# Patient Record
Sex: MALE | Race: WHITE | NOT HISPANIC OR LATINO | Employment: OTHER | URBAN - NONMETROPOLITAN AREA
[De-identification: names, ages, dates, MRNs, and addresses within clinical notes are randomized per-mention and may not be internally consistent; named-entity substitution may affect disease eponyms.]

---

## 2021-11-18 ENCOUNTER — OFFICE VISIT (OUTPATIENT)
Dept: FAMILY MEDICINE | Facility: CLINIC | Age: 61
End: 2021-11-18
Payer: MEDICARE

## 2021-11-18 VITALS
HEIGHT: 73 IN | HEART RATE: 72 BPM | OXYGEN SATURATION: 96 % | DIASTOLIC BLOOD PRESSURE: 90 MMHG | BODY MASS INDEX: 30.22 KG/M2 | TEMPERATURE: 98 F | SYSTOLIC BLOOD PRESSURE: 134 MMHG | RESPIRATION RATE: 16 BRPM | WEIGHT: 228 LBS

## 2021-11-18 DIAGNOSIS — Z00.00 WELL ADULT EXAM: ICD-10-CM

## 2021-11-18 DIAGNOSIS — E66.8 OTHER OBESITY: ICD-10-CM

## 2021-11-18 DIAGNOSIS — Q61.3 POLYCYSTIC KIDNEY DISEASE: Primary | ICD-10-CM

## 2021-11-18 DIAGNOSIS — Z12.11 ENCOUNTER FOR SCREENING COLONOSCOPY: ICD-10-CM

## 2021-11-18 DIAGNOSIS — Z12.5 ENCOUNTER FOR SCREENING FOR MALIGNANT NEOPLASM OF PROSTATE: ICD-10-CM

## 2021-11-18 LAB
ALBUMIN SERPL BCP-MCNC: 3.5 G/DL (ref 3.5–5)
ALBUMIN/GLOB SERPL: 0.9 {RATIO}
ALP SERPL-CCNC: 71 U/L (ref 45–115)
ALT SERPL W P-5'-P-CCNC: 26 U/L (ref 16–61)
ANION GAP SERPL CALCULATED.3IONS-SCNC: 7 MMOL/L (ref 7–16)
AST SERPL W P-5'-P-CCNC: 19 U/L (ref 15–37)
BASOPHILS # BLD AUTO: 0.04 K/UL (ref 0–0.2)
BASOPHILS NFR BLD AUTO: 0.6 % (ref 0–1)
BILIRUB SERPL-MCNC: 0.4 MG/DL (ref 0–1.2)
BUN SERPL-MCNC: 17 MG/DL (ref 7–18)
BUN/CREAT SERPL: 12 (ref 6–20)
CALCIUM SERPL-MCNC: 9 MG/DL (ref 8.5–10.1)
CHLORIDE SERPL-SCNC: 107 MMOL/L (ref 98–107)
CHOLEST SERPL-MCNC: 225 MG/DL (ref 0–200)
CHOLEST/HDLC SERPL: 3 {RATIO}
CO2 SERPL-SCNC: 28 MMOL/L (ref 21–32)
CREAT SERPL-MCNC: 1.4 MG/DL (ref 0.7–1.3)
DIFFERENTIAL METHOD BLD: ABNORMAL
EOSINOPHIL # BLD AUTO: 0.19 K/UL (ref 0–0.5)
EOSINOPHIL NFR BLD AUTO: 2.7 % (ref 1–4)
ERYTHROCYTE [DISTWIDTH] IN BLOOD BY AUTOMATED COUNT: 13 % (ref 11.5–14.5)
GLOBULIN SER-MCNC: 3.8 G/DL (ref 2–4)
GLUCOSE SERPL-MCNC: 93 MG/DL (ref 74–106)
HCT VFR BLD AUTO: 46.1 % (ref 40–54)
HDLC SERPL-MCNC: 75 MG/DL (ref 40–60)
HGB BLD-MCNC: 15.2 G/DL (ref 13.5–18)
IMM GRANULOCYTES # BLD AUTO: 0.02 K/UL (ref 0–0.04)
IMM GRANULOCYTES NFR BLD: 0.3 % (ref 0–0.4)
LDLC SERPL CALC-MCNC: 116 MG/DL
LDLC/HDLC SERPL: 1.5 {RATIO}
LYMPHOCYTES # BLD AUTO: 2.39 K/UL (ref 1–4.8)
LYMPHOCYTES NFR BLD AUTO: 34.3 % (ref 27–41)
MCH RBC QN AUTO: 28.4 PG (ref 27–31)
MCHC RBC AUTO-ENTMCNC: 33 G/DL (ref 32–36)
MCV RBC AUTO: 86.2 FL (ref 80–96)
MONOCYTES # BLD AUTO: 0.65 K/UL (ref 0–0.8)
MONOCYTES NFR BLD AUTO: 9.3 % (ref 2–6)
MPC BLD CALC-MCNC: 11.1 FL (ref 9.4–12.4)
NEUTROPHILS # BLD AUTO: 3.68 K/UL (ref 1.8–7.7)
NEUTROPHILS NFR BLD AUTO: 52.8 % (ref 53–65)
NONHDLC SERPL-MCNC: 150 MG/DL
NRBC # BLD AUTO: 0 X10E3/UL
NRBC, AUTO (.00): 0 %
PLATELET # BLD AUTO: 248 K/UL (ref 150–400)
POTASSIUM SERPL-SCNC: 4.1 MMOL/L (ref 3.5–5.1)
PROT SERPL-MCNC: 7.3 G/DL (ref 6.4–8.2)
PSA SERPL-MCNC: 6.48 NG/ML (ref 0–4.1)
RBC # BLD AUTO: 5.35 M/UL (ref 4.6–6.2)
SODIUM SERPL-SCNC: 138 MMOL/L (ref 136–145)
TRIGL SERPL-MCNC: 169 MG/DL (ref 35–150)
VLDLC SERPL-MCNC: 34 MG/DL
WBC # BLD AUTO: 6.97 K/UL (ref 4.5–11)

## 2021-11-18 PROCEDURE — 3075F PR MOST RECENT SYSTOLIC BLOOD PRESS GE 130-139MM HG: ICD-10-PCS | Mod: ,,, | Performed by: FAMILY MEDICINE

## 2021-11-18 PROCEDURE — G0103 PSA SCREENING: HCPCS | Mod: ,,, | Performed by: CLINICAL MEDICAL LABORATORY

## 2021-11-18 PROCEDURE — 80053 COMPREHEN METABOLIC PANEL: CPT | Mod: ,,, | Performed by: CLINICAL MEDICAL LABORATORY

## 2021-11-18 PROCEDURE — 85025 COMPLETE CBC W/AUTO DIFF WBC: CPT | Mod: ,,, | Performed by: CLINICAL MEDICAL LABORATORY

## 2021-11-18 PROCEDURE — 99203 OFFICE O/P NEW LOW 30 MIN: CPT | Mod: ,,, | Performed by: FAMILY MEDICINE

## 2021-11-18 PROCEDURE — 80061 LIPID PANEL: ICD-10-PCS | Mod: ,,, | Performed by: CLINICAL MEDICAL LABORATORY

## 2021-11-18 PROCEDURE — 99203 PR OFFICE/OUTPT VISIT, NEW, LEVL III, 30-44 MIN: ICD-10-PCS | Mod: ,,, | Performed by: FAMILY MEDICINE

## 2021-11-18 PROCEDURE — 3008F BODY MASS INDEX DOCD: CPT | Mod: ,,, | Performed by: FAMILY MEDICINE

## 2021-11-18 PROCEDURE — G0103 PSA, SCREENING: ICD-10-PCS | Mod: ,,, | Performed by: CLINICAL MEDICAL LABORATORY

## 2021-11-18 PROCEDURE — 3075F SYST BP GE 130 - 139MM HG: CPT | Mod: ,,, | Performed by: FAMILY MEDICINE

## 2021-11-18 PROCEDURE — 1159F PR MEDICATION LIST DOCUMENTED IN MEDICAL RECORD: ICD-10-PCS | Mod: ,,, | Performed by: FAMILY MEDICINE

## 2021-11-18 PROCEDURE — 80053 COMPREHENSIVE METABOLIC PANEL: ICD-10-PCS | Mod: ,,, | Performed by: CLINICAL MEDICAL LABORATORY

## 2021-11-18 PROCEDURE — 85025 CBC WITH DIFFERENTIAL: ICD-10-PCS | Mod: ,,, | Performed by: CLINICAL MEDICAL LABORATORY

## 2021-11-18 PROCEDURE — 3008F PR BODY MASS INDEX (BMI) DOCUMENTED: ICD-10-PCS | Mod: ,,, | Performed by: FAMILY MEDICINE

## 2021-11-18 PROCEDURE — 1159F MED LIST DOCD IN RCRD: CPT | Mod: ,,, | Performed by: FAMILY MEDICINE

## 2021-11-18 PROCEDURE — 80061 LIPID PANEL: CPT | Mod: ,,, | Performed by: CLINICAL MEDICAL LABORATORY

## 2021-11-18 PROCEDURE — 3080F PR MOST RECENT DIASTOLIC BLOOD PRESSURE >= 90 MM HG: ICD-10-PCS | Mod: ,,, | Performed by: FAMILY MEDICINE

## 2021-11-18 PROCEDURE — 3080F DIAST BP >= 90 MM HG: CPT | Mod: ,,, | Performed by: FAMILY MEDICINE

## 2021-11-18 RX ORDER — ESOMEPRAZOLE MAGNESIUM 40 MG/1
40 CAPSULE, DELAYED RELEASE ORAL DAILY PRN
COMMUNITY

## 2021-11-19 DIAGNOSIS — R97.20 ELEVATED PSA: Primary | ICD-10-CM

## 2021-11-19 DIAGNOSIS — Z12.5 ENCOUNTER FOR SCREENING FOR MALIGNANT NEOPLASM OF PROSTATE: ICD-10-CM

## 2021-11-22 ENCOUNTER — HOSPITAL ENCOUNTER (OUTPATIENT)
Dept: RADIOLOGY | Facility: HOSPITAL | Age: 61
Discharge: HOME | End: 2021-11-22
Attending: FAMILY MEDICINE
Payer: MEDICARE

## 2021-11-22 DIAGNOSIS — Q61.3 POLYCYSTIC KIDNEY DISEASE: ICD-10-CM

## 2021-11-22 PROCEDURE — 76770 US EXAM ABDO BACK WALL COMP: CPT | Mod: TC

## 2021-11-23 ENCOUNTER — PATIENT MESSAGE (OUTPATIENT)
Dept: FAMILY MEDICINE | Facility: CLINIC | Age: 61
End: 2021-11-23
Payer: MEDICARE

## 2022-01-28 ENCOUNTER — OFFICE VISIT (OUTPATIENT)
Dept: FAMILY MEDICINE | Facility: CLINIC | Age: 62
End: 2022-01-28
Payer: MEDICARE

## 2022-01-28 VITALS
DIASTOLIC BLOOD PRESSURE: 78 MMHG | TEMPERATURE: 100 F | OXYGEN SATURATION: 95 % | RESPIRATION RATE: 18 BRPM | SYSTOLIC BLOOD PRESSURE: 138 MMHG | HEART RATE: 83 BPM

## 2022-01-28 DIAGNOSIS — J06.9 UPPER RESPIRATORY TRACT INFECTION, UNSPECIFIED TYPE: Primary | ICD-10-CM

## 2022-01-28 DIAGNOSIS — R05.8 COUGH WITH EXPOSURE TO COVID-19 VIRUS: ICD-10-CM

## 2022-01-28 DIAGNOSIS — Z20.822 COUGH WITH EXPOSURE TO COVID-19 VIRUS: ICD-10-CM

## 2022-01-28 DIAGNOSIS — R06.2 WHEEZES: ICD-10-CM

## 2022-01-28 LAB
CTP QC/QA: YES
SARS-COV-2 AG RESP QL IA.RAPID: NEGATIVE

## 2022-01-28 PROCEDURE — 1160F PR REVIEW ALL MEDS BY PRESCRIBER/CLIN PHARMACIST DOCUMENTED: ICD-10-PCS | Mod: ,,, | Performed by: NURSE PRACTITIONER

## 2022-01-28 PROCEDURE — 87426 SARSCOV CORONAVIRUS AG IA: CPT | Mod: QW,,, | Performed by: NURSE PRACTITIONER

## 2022-01-28 PROCEDURE — 3075F SYST BP GE 130 - 139MM HG: CPT | Mod: ,,, | Performed by: NURSE PRACTITIONER

## 2022-01-28 PROCEDURE — 1160F RVW MEDS BY RX/DR IN RCRD: CPT | Mod: ,,, | Performed by: NURSE PRACTITIONER

## 2022-01-28 PROCEDURE — 1159F MED LIST DOCD IN RCRD: CPT | Mod: ,,, | Performed by: NURSE PRACTITIONER

## 2022-01-28 PROCEDURE — 96372 PR INJECTION,THERAP/PROPH/DIAG2ST, IM OR SUBCUT: ICD-10-PCS | Mod: ,,, | Performed by: NURSE PRACTITIONER

## 2022-01-28 PROCEDURE — 3075F PR MOST RECENT SYSTOLIC BLOOD PRESS GE 130-139MM HG: ICD-10-PCS | Mod: ,,, | Performed by: NURSE PRACTITIONER

## 2022-01-28 PROCEDURE — 3078F DIAST BP <80 MM HG: CPT | Mod: ,,, | Performed by: NURSE PRACTITIONER

## 2022-01-28 PROCEDURE — 99213 OFFICE O/P EST LOW 20 MIN: CPT | Mod: 25,,, | Performed by: NURSE PRACTITIONER

## 2022-01-28 PROCEDURE — 99213 PR OFFICE/OUTPT VISIT, EST, LEVL III, 20-29 MIN: ICD-10-PCS | Mod: 25,,, | Performed by: NURSE PRACTITIONER

## 2022-01-28 PROCEDURE — 96372 THER/PROPH/DIAG INJ SC/IM: CPT | Mod: ,,, | Performed by: NURSE PRACTITIONER

## 2022-01-28 PROCEDURE — 1159F PR MEDICATION LIST DOCUMENTED IN MEDICAL RECORD: ICD-10-PCS | Mod: ,,, | Performed by: NURSE PRACTITIONER

## 2022-01-28 PROCEDURE — 3078F PR MOST RECENT DIASTOLIC BLOOD PRESSURE < 80 MM HG: ICD-10-PCS | Mod: ,,, | Performed by: NURSE PRACTITIONER

## 2022-01-28 PROCEDURE — 87426 SARS CORONAVIRUS 2 ANTIGEN POCT: ICD-10-PCS | Mod: QW,,, | Performed by: NURSE PRACTITIONER

## 2022-01-28 RX ORDER — AZITHROMYCIN 250 MG/1
TABLET, FILM COATED ORAL
Qty: 6 TABLET | Refills: 0 | Status: SHIPPED | OUTPATIENT
Start: 2022-01-28 | End: 2022-02-02

## 2022-01-28 RX ORDER — DEXAMETHASONE SODIUM PHOSPHATE 4 MG/ML
4 INJECTION, SOLUTION INTRA-ARTICULAR; INTRALESIONAL; INTRAMUSCULAR; INTRAVENOUS; SOFT TISSUE
Status: COMPLETED | OUTPATIENT
Start: 2022-01-28 | End: 2022-01-28

## 2022-01-28 RX ORDER — CEFTRIAXONE 1 G/1
1 INJECTION, POWDER, FOR SOLUTION INTRAMUSCULAR; INTRAVENOUS
Status: COMPLETED | OUTPATIENT
Start: 2022-01-28 | End: 2022-01-28

## 2022-01-28 RX ORDER — LEVALBUTEROL TARTRATE 45 UG/1
1-2 AEROSOL, METERED ORAL EVERY 4 HOURS PRN
Qty: 15 G | Refills: 0 | Status: SHIPPED | OUTPATIENT
Start: 2022-01-28 | End: 2023-09-26 | Stop reason: ALTCHOICE

## 2022-01-28 RX ADMIN — DEXAMETHASONE SODIUM PHOSPHATE 4 MG: 4 INJECTION, SOLUTION INTRA-ARTICULAR; INTRALESIONAL; INTRAMUSCULAR; INTRAVENOUS; SOFT TISSUE at 11:01

## 2022-01-28 RX ADMIN — CEFTRIAXONE 1 G: 1 INJECTION, POWDER, FOR SOLUTION INTRAMUSCULAR; INTRAVENOUS at 11:01

## 2022-01-28 NOTE — PROGRESS NOTES
2022     DAVIN Napier   [facility]  [address]  [place] [address]     PATIENT NAME: Romero Diaz  : 1960  DATE: 22  MRN: [id number]      Billing Provider: DAVIN Napier  Level of Service:   Patient PCP Information     Provider PCP Type    Chandler García DO General          Reason for Visit / Chief Complaint: COVID-19 Concerns (Positive home test), Nasal Congestion, Cough, Sore Throat, and Generalized Body Aches       Update PCP  Update Chief Complaint         History of Present Illness / Problem Focused Workflow     Romero Diaz presents to the clinic reports positive covid test at home and having cough wheezes nasal congestion sore throat body aches not vaccinated spouse in vehicle also unwell      Review of Systems     Review of Systems   Constitutional: Positive for chills and fatigue. Negative for activity change and appetite change.   HENT: Positive for nasal congestion and sore throat. Negative for ear pain, trouble swallowing and voice change.    Eyes: Negative for visual disturbance.   Respiratory: Positive for cough and wheezing. Negative for chest tightness and shortness of breath.    Cardiovascular: Negative for chest pain, palpitations, leg swelling and claudication.   Gastrointestinal: Negative for abdominal pain, change in bowel habit, nausea, vomiting and change in bowel habit.   Genitourinary: Negative for difficulty urinating.   Musculoskeletal: Positive for myalgias.   Integumentary:  Negative for pallor and rash.   Neurological: Positive for headaches.        Medical / Social / Family History     Past Medical History:   Diagnosis Date    Acquired polycystic kidney disease     Chronic sinusitis     Deviated nasal septum     SVT (supraventricular tachycardia)        Past Surgical History:   Procedure Laterality Date    CARDIAC ELECTROPHYSIOLOGY STUDY AND ABLATION       at [facility]    kidney removed Left     left  leg surgery         Social History    reports that he has never smoked. He has never used smokeless tobacco. He reports current alcohol use. He reports that he does not use drugs.    Family History  's family history includes Esophageal cancer in his father; Osteoporosis in his mother; Stomach cancer in his father.    Medications and Allergies     Medications  Outpatient Medications Marked as Taking for the 1/28/22 encounter (Office Visit) with DAVIN Napier   Medication Sig Dispense Refill    esomeprazole (NEXIUM) 40 MG capsule Take 40 mg by mouth daily as needed.       Current Facility-Administered Medications for the 1/28/22 encounter (Office Visit) with DAVIN Napier   Medication Dose Route Frequency Provider Last Rate Last Admin    [COMPLETED] cefTRIAXone injection 1 g  1 g Intramuscular 1 time in Clinic/HOD DAVIN Napier   1 g at 01/28/22 1103    [COMPLETED] dexamethasone injection 4 mg  4 mg Intramuscular 1 time in Clinic/HOD DAVIN Napier   4 mg at 01/28/22 1104       Allergies  Review of patient's allergies indicates:  No Known Allergies    Physical Examination     Vitals:    01/28/22 1024   BP: 138/78   BP Location: Left arm   Patient Position: Sitting   BP Method: Medium (Manual)   Pulse: 83   Resp: 18   Temp: 99.5 °F (37.5 °C)   TempSrc: Oral   SpO2: 95%      Physical Exam  Vitals and nursing note reviewed.   Constitutional:       General: He is not in acute distress.     Appearance: Normal appearance. He is not ill-appearing or toxic-appearing.   HENT:      Head: Normocephalic.      Nose: Rhinorrhea present.   Eyes:      Pupils: Pupils are equal, round, and reactive to light.      Comments: injected   Cardiovascular:      Rate and Rhythm: Normal rate and regular rhythm.   Pulmonary:      Effort: Pulmonary effort is normal.      Breath sounds: Wheezing present. No rhonchi or rales.   Musculoskeletal:      Cervical back: Normal range  of motion and neck supple. No rigidity or tenderness.   Lymphadenopathy:      Cervical: No cervical adenopathy.   Skin:     General: Skin is warm and dry.      Capillary Refill: Capillary refill takes less than 2 seconds.   Neurological:      Mental Status: He is alert and oriented to person, place, and time.   Psychiatric:         Behavior: Behavior normal.          Assessment and Plan (including Health Maintenance)      Problem List  Smart Sets  Document Outside HM   :    Plan:   rocephin decadron today will send azithromycin and xopenex inhaler, stay well hydrated        Health Maintenance Due   Topic Date Due    Hepatitis C Screening  Never done    TETANUS VACCINE  Never done    Colorectal Cancer Screening  Never done       Problem List Items Addressed This Visit    None     Visit Diagnoses     Upper respiratory tract infection, unspecified type    -  Primary    Relevant Medications    cefTRIAXone injection 1 g (Completed) (Start on 1/28/2022 11:15 AM)    dexamethasone injection 4 mg (Completed) (Start on 1/28/2022 11:15 AM)    azithromycin (Z-BLAYNE) 250 MG tablet    levalbuterol (XOPENEX HFA) 45 mcg/actuation inhaler    Cough with exposure to COVID-19 virus        Relevant Medications    dexamethasone injection 4 mg (Completed) (Start on 1/28/2022 11:15 AM)    levalbuterol (XOPENEX HFA) 45 mcg/actuation inhaler    Other Relevant Orders    SARS Coronavirus 2 Antigen, POCT (Completed)    Wheezes        Relevant Medications    cefTRIAXone injection 1 g (Completed) (Start on 1/28/2022 11:15 AM)    dexamethasone injection 4 mg (Completed) (Start on 1/28/2022 11:15 AM)    levalbuterol (XOPENEX HFA) 45 mcg/actuation inhaler        Upper respiratory tract infection, unspecified type  -     cefTRIAXone injection 1 g  -     dexamethasone injection 4 mg  -     azithromycin (Z-BLAYNE) 250 MG tablet; Take 2 tablets by mouth on day 1; Take 1 tablet by mouth on days 2-5  Dispense: 6 tablet; Refill: 0  -     levalbuterol  (XOPENEX HFA) 45 mcg/actuation inhaler; Inhale 1-2 puffs into the lungs every 4 (four) hours as needed for Wheezing. Rescue  Dispense: 15 g; Refill: 0    Cough with exposure to COVID-19 virus  -     SARS Coronavirus 2 Antigen, POCT  -     dexamethasone injection 4 mg  -     levalbuterol (XOPENEX HFA) 45 mcg/actuation inhaler; Inhale 1-2 puffs into the lungs every 4 (four) hours as needed for Wheezing. Rescue  Dispense: 15 g; Refill: 0    Wheezes  -     cefTRIAXone injection 1 g  -     dexamethasone injection 4 mg  -     levalbuterol (XOPENEX HFA) 45 mcg/actuation inhaler; Inhale 1-2 puffs into the lungs every 4 (four) hours as needed for Wheezing. Rescue  Dispense: 15 g; Refill: 0       Health Maintenance Topics with due status: Not Due       Topic Last Completion Date    Lipid Panel 11/18/2021       Procedures     Future Appointments   Date Time Provider Department Center   5/24/2022  1:15 PM [facility] [place]        No follow-ups on file.     Signature:  DAVIN Napier    Date of encounter: 1/28/22

## 2022-01-28 NOTE — PATIENT INSTRUCTIONS
rocephin decadron today will send azithromycin and xopenex inhaler, stay well hydrated    Covid Medication List          Vitamin D 5,000 units twice a day     Zinc 50 mg twice a day     Pepcid 20 mg once a day     Zyrtec 10 mg once a day     Aspirin 325 mg once a day     Vitamin C 1000 mg twice a day     Melatonin 3 mg at bedtime     Increase fluid intake and rest!      Stay home until:    At least 5 days have passed since your symptoms began (or since your positive test, if you have no symptoms),    AND you have no more symptoms, or your symptoms are improving and you have no fever and do not need fever-reducing medication such as Tylenol (acetaminophen) or Advil (ibuprofen).    Remain home after 5 days as long as you have a fever. Remaining at home is important to prevent transmitting infection to others.      The [place] Department of Health ([facility]) recommends the following after  exposure to a COVID-19 infected person:  Any exposed individual who develops symptoms should be tested and stay home.  If you have had a booster shot OR you have been fully vaccinated but are not yet eligible for  a booster because of timing*, you should:  ? Wear a mask around others for 10 days.  ? Test on day 5, if possible.  ? If you develop symptoms get a test and stay home.  *Applies to individuals who completed the primary series of Pfizer or Moderna vaccine within the last 6  months OR completed the primary series of J&J vaccine within the last 2 months  If you are unvaccinated OR are currently eligible for a booster dose but have not yet received  one* you should:  ? Stay home for 5 days. After that continue to wear a mask around others for 5 additional  days.  ? If you cant quarantine you must wear a mask for 10 days.  ? Test on day 5 if possible.  ? If you develop symptoms get a test and stay home  *Applies to individuals who completed the primary series of Pfizer or Moderna vaccine over 6 months  ago and are not  boosted OR completed the primary series of J&J over 2 months ago and are not  boosted  Additional considerations:  ? Please let your provider know you have been exposed if you do go in for testing.  ? If you live in a household with a person who is a confirmed case of COVID-19, your last  exposure is when is when you last had contact less than 6 feet for 15 minutes or more.  ? During the 5 day quarantine at home you may be allowed to continue to work if your employer  says you are essential, and you continue to have no symptoms, undergo symptom and  temperature monitoring by your facility and wear a mask while you are at work and around  others. Contact your employer for approval.  o If you do return to work, you should continue to self-quarantine at home at all other  times.  Official CDC Update on Isolation and Quarantine can be found at  https://www.cdc.gov/media/releases/2021/s1227-isolation-quarantine-guidance.html

## 2022-02-21 PROBLEM — Z00.00 WELL ADULT EXAM: Status: RESOLVED | Noted: 2021-11-18 | Resolved: 2022-02-21

## 2022-11-09 DIAGNOSIS — Z71.89 COMPLEX CARE COORDINATION: ICD-10-CM

## 2022-11-26 ENCOUNTER — OFFICE VISIT (OUTPATIENT)
Dept: FAMILY MEDICINE | Facility: CLINIC | Age: 62
End: 2022-11-26
Payer: MEDICARE

## 2022-11-26 VITALS
BODY MASS INDEX: 30.48 KG/M2 | TEMPERATURE: 100 F | WEIGHT: 230 LBS | DIASTOLIC BLOOD PRESSURE: 84 MMHG | OXYGEN SATURATION: 99 % | HEART RATE: 94 BPM | HEIGHT: 73 IN | SYSTOLIC BLOOD PRESSURE: 120 MMHG

## 2022-11-26 DIAGNOSIS — J02.9 SORE THROAT: ICD-10-CM

## 2022-11-26 DIAGNOSIS — Z20.822 CONTACT WITH AND (SUSPECTED) EXPOSURE TO COVID-19: ICD-10-CM

## 2022-11-26 DIAGNOSIS — U07.1 COVID-19: Primary | ICD-10-CM

## 2022-11-26 LAB
CTP QC/QA: YES
CTP QC/QA: YES
FLUAV AG NPH QL: NEGATIVE
FLUBV AG NPH QL: NEGATIVE
S PYO RRNA THROAT QL PROBE: NEGATIVE
SARS-COV-2 AG RESP QL IA.RAPID: POSITIVE

## 2022-11-26 PROCEDURE — 87880 STREP A ASSAY W/OPTIC: CPT | Mod: QW,,, | Performed by: NURSE PRACTITIONER

## 2022-11-26 PROCEDURE — 3079F DIAST BP 80-89 MM HG: CPT | Mod: ,,, | Performed by: NURSE PRACTITIONER

## 2022-11-26 PROCEDURE — 87880 POCT RAPID STREP A: ICD-10-PCS | Mod: QW,,, | Performed by: NURSE PRACTITIONER

## 2022-11-26 PROCEDURE — 1160F PR REVIEW ALL MEDS BY PRESCRIBER/CLIN PHARMACIST DOCUMENTED: ICD-10-PCS | Mod: ,,, | Performed by: NURSE PRACTITIONER

## 2022-11-26 PROCEDURE — 1159F PR MEDICATION LIST DOCUMENTED IN MEDICAL RECORD: ICD-10-PCS | Mod: ,,, | Performed by: NURSE PRACTITIONER

## 2022-11-26 PROCEDURE — 3008F PR BODY MASS INDEX (BMI) DOCUMENTED: ICD-10-PCS | Mod: ,,, | Performed by: NURSE PRACTITIONER

## 2022-11-26 PROCEDURE — 87428 POCT SARS-COV2 (COVID) WITH FLU ANTIGEN: ICD-10-PCS | Mod: QW,,, | Performed by: NURSE PRACTITIONER

## 2022-11-26 PROCEDURE — 3008F BODY MASS INDEX DOCD: CPT | Mod: ,,, | Performed by: NURSE PRACTITIONER

## 2022-11-26 PROCEDURE — 3074F SYST BP LT 130 MM HG: CPT | Mod: ,,, | Performed by: NURSE PRACTITIONER

## 2022-11-26 PROCEDURE — 1159F MED LIST DOCD IN RCRD: CPT | Mod: ,,, | Performed by: NURSE PRACTITIONER

## 2022-11-26 PROCEDURE — 3074F PR MOST RECENT SYSTOLIC BLOOD PRESSURE < 130 MM HG: ICD-10-PCS | Mod: ,,, | Performed by: NURSE PRACTITIONER

## 2022-11-26 PROCEDURE — 99213 PR OFFICE/OUTPT VISIT, EST, LEVL III, 20-29 MIN: ICD-10-PCS | Mod: ,,, | Performed by: NURSE PRACTITIONER

## 2022-11-26 PROCEDURE — 99213 OFFICE O/P EST LOW 20 MIN: CPT | Mod: ,,, | Performed by: NURSE PRACTITIONER

## 2022-11-26 PROCEDURE — 3079F PR MOST RECENT DIASTOLIC BLOOD PRESSURE 80-89 MM HG: ICD-10-PCS | Mod: ,,, | Performed by: NURSE PRACTITIONER

## 2022-11-26 PROCEDURE — 87428 SARSCOV & INF VIR A&B AG IA: CPT | Mod: QW,,, | Performed by: NURSE PRACTITIONER

## 2022-11-26 PROCEDURE — 1160F RVW MEDS BY RX/DR IN RCRD: CPT | Mod: ,,, | Performed by: NURSE PRACTITIONER

## 2022-11-26 NOTE — PROGRESS NOTES
[facility]    Chief Complaint      Chief Complaint   Patient presents with    Sore Throat    Cough    Nasal Congestion    Generalized Body Aches    Fatigue    Fever    Chills    Diarrhea    Documentation Only     Started wednesday       History of Present Illness      Romero Diaz is a 61 y.o. male. He  has a past medical history of Acquired polycystic kidney disease, Chronic sinusitis, Deviated nasal septum, and SVT (supraventricular tachycardia)., who presents today for URI type symptoms x4 days.    Past Medical History:  Past Medical History:   Diagnosis Date    Acquired polycystic kidney disease     Chronic sinusitis     Deviated nasal septum     SVT (supraventricular tachycardia)        Past Surgical History:   has a past surgical history that includes Cardiac electrophysiology study and ablation; left leg surgery; and kidney removed (Left, 2007).    Social History:  Social History     Tobacco Use    Smoking status: Never    Smokeless tobacco: Never   Substance Use Topics    Alcohol use: Yes     Comment: 3 beers daily     Drug use: Never       I personally reviewed all past medical, surgical, and social.     Review of Systems   Constitutional:  Positive for chills, fatigue and fever.   HENT:  Positive for congestion and sore throat. Negative for rhinorrhea and sneezing.    Respiratory:  Positive for cough.    Gastrointestinal:  Negative for diarrhea, nausea and vomiting.   Musculoskeletal:  Positive for myalgias.   Skin:  Negative for rash.   Neurological:  Negative for headaches.      Medications:  Outpatient Encounter Medications as of 11/26/2022   Medication Sig Dispense Refill    esomeprazole (NEXIUM) 40 MG capsule Take 40 mg by mouth daily as needed.      levalbuterol (XOPENEX HFA) 45 mcg/actuation inhaler Inhale 1-2 puffs into the lungs every 4 (four) hours as needed for Wheezing. Rescue 15 g 0     No facility-administered encounter medications on file as of 11/26/2022.       Allergies:  Review  "of patient's allergies indicates:  No Known Allergies    Health Maintenance:    There is no immunization history on file for this patient.   Health Maintenance   Topic Date Due    Hepatitis C Screening  Never done    TETANUS VACCINE  Never done    Lipid Panel  11/18/2026        Physical Exam      Vital Signs  Temp: 99.7 °F (37.6 °C)  Temp src: Oral  Pulse: 94  SpO2: 99 %  BP: 120/84  Height and Weight  Height: 6' 1" (185.4 cm)  Weight: 104.3 kg (230 lb)  BSA (Calculated - sq m): 2.32 sq meters  BMI (Calculated): 30.4  Weight in (lb) to have BMI = 25: 189.1]    Physical Exam  Vitals and nursing note reviewed.   Constitutional:       General: He is not in acute distress.     Appearance: He is ill-appearing.   HENT:      Head: Normocephalic.      Right Ear: Hearing normal.      Left Ear: Hearing normal.      Nose: Congestion present.   Eyes:      General: Lids are normal.      Conjunctiva/sclera: Conjunctivae normal.   Neck:      Thyroid: No thyromegaly.   Cardiovascular:      Rate and Rhythm: Normal rate.   Pulmonary:      Effort: Pulmonary effort is normal. No respiratory distress.   Musculoskeletal:         General: Normal range of motion.      Cervical back: Normal range of motion and neck supple.   Skin:     General: Skin is warm and dry.   Neurological:      General: No focal deficit present.      Mental Status: He is alert and oriented to person, place, and time.      Gait: Gait is intact.        Laboratory:  CBC:  Recent Labs   Lab 11/18/21  1538   WBC 6.97   RBC 5.35   Hemoglobin 15.2   Hematocrit 46.1   Platelet Count 248   MCV 86.2   MCH 28.4   MCHC 33.0     CMP:  Recent Labs   Lab 11/18/21  1538   Glucose 93   Calcium 9.0   Albumin 3.5   Total Protein 7.3   Sodium 138   Potassium 4.1   CO2 28   Chloride 107   BUN 17   Alk Phos 71   ALT 26   AST 19   Bilirubin, Total 0.4     LIPIDS:  Recent Labs   Lab 11/18/21  1538   HDL Cholesterol 75 H   Cholesterol 225 H   Triglycerides 169 H   LDL Calculated 116 "   Cholesterol/HDL Ratio (Risk Factor) 3.0   Non-     TSH:      A1C:        Assessment/Plan     Romero Diaz is a 61 y.o.male with:     1. Contact with and (suspected) exposure to covid-19  - POCT SARS-COV2 (COVID) with Flu Antigen    2. Sore throat  - POCT rapid strep A    3. COVID-19     Instructed to take Zyrtec, Pepcid, and Vit C OTC daily x 10 days  May return to work on 12/1/22 and then must wear a mask at all times anytime outside of the home until 12/5/22.      Total time spent face-to-face and non-face-to-face coordinating care for this encounter was: 20 minutes min    Chronic conditions status updated as per HPI.  Other than changes above, cont current medications and maintain follow up with specialists.  Return to clinic prn if symptoms worsen or fail to improve.    Elenita Dyer, FNP  [facility]

## 2022-12-06 ENCOUNTER — OFFICE VISIT (OUTPATIENT)
Dept: FAMILY MEDICINE | Facility: CLINIC | Age: 62
End: 2022-12-06
Payer: MEDICARE

## 2022-12-06 VITALS
DIASTOLIC BLOOD PRESSURE: 80 MMHG | BODY MASS INDEX: 29.69 KG/M2 | SYSTOLIC BLOOD PRESSURE: 140 MMHG | TEMPERATURE: 99 F | HEIGHT: 73 IN | WEIGHT: 224 LBS | OXYGEN SATURATION: 96 % | RESPIRATION RATE: 20 BRPM | HEART RATE: 94 BPM

## 2022-12-06 DIAGNOSIS — R05.9 COUGH, UNSPECIFIED TYPE: ICD-10-CM

## 2022-12-06 DIAGNOSIS — U07.1 COVID-19: Primary | ICD-10-CM

## 2022-12-06 PROCEDURE — 3008F BODY MASS INDEX DOCD: CPT | Mod: ,,, | Performed by: NURSE PRACTITIONER

## 2022-12-06 PROCEDURE — 1159F MED LIST DOCD IN RCRD: CPT | Mod: ,,, | Performed by: NURSE PRACTITIONER

## 2022-12-06 PROCEDURE — 3079F DIAST BP 80-89 MM HG: CPT | Mod: ,,, | Performed by: NURSE PRACTITIONER

## 2022-12-06 PROCEDURE — 3077F SYST BP >= 140 MM HG: CPT | Mod: ,,, | Performed by: NURSE PRACTITIONER

## 2022-12-06 PROCEDURE — 99213 PR OFFICE/OUTPT VISIT, EST, LEVL III, 20-29 MIN: ICD-10-PCS | Mod: ,,, | Performed by: NURSE PRACTITIONER

## 2022-12-06 PROCEDURE — 1160F PR REVIEW ALL MEDS BY PRESCRIBER/CLIN PHARMACIST DOCUMENTED: ICD-10-PCS | Mod: ,,, | Performed by: NURSE PRACTITIONER

## 2022-12-06 PROCEDURE — 3077F PR MOST RECENT SYSTOLIC BLOOD PRESSURE >= 140 MM HG: ICD-10-PCS | Mod: ,,, | Performed by: NURSE PRACTITIONER

## 2022-12-06 PROCEDURE — 3008F PR BODY MASS INDEX (BMI) DOCUMENTED: ICD-10-PCS | Mod: ,,, | Performed by: NURSE PRACTITIONER

## 2022-12-06 PROCEDURE — 3079F PR MOST RECENT DIASTOLIC BLOOD PRESSURE 80-89 MM HG: ICD-10-PCS | Mod: ,,, | Performed by: NURSE PRACTITIONER

## 2022-12-06 PROCEDURE — 1159F PR MEDICATION LIST DOCUMENTED IN MEDICAL RECORD: ICD-10-PCS | Mod: ,,, | Performed by: NURSE PRACTITIONER

## 2022-12-06 PROCEDURE — 1160F RVW MEDS BY RX/DR IN RCRD: CPT | Mod: ,,, | Performed by: NURSE PRACTITIONER

## 2022-12-06 PROCEDURE — 99213 OFFICE O/P EST LOW 20 MIN: CPT | Mod: ,,, | Performed by: NURSE PRACTITIONER

## 2022-12-06 RX ORDER — AZITHROMYCIN 250 MG/1
TABLET, FILM COATED ORAL
Qty: 6 TABLET | Refills: 0 | Status: SHIPPED | OUTPATIENT
Start: 2022-12-06 | End: 2022-12-11

## 2022-12-06 RX ORDER — ALBUTEROL SULFATE 90 UG/1
2 AEROSOL, METERED RESPIRATORY (INHALATION) EVERY 6 HOURS PRN
Qty: 18 G | Refills: 0 | Status: SHIPPED | OUTPATIENT
Start: 2022-12-06 | End: 2023-09-26 | Stop reason: ALTCHOICE

## 2022-12-06 NOTE — PROGRESS NOTES
DAVIN Toro   [place] [address]  [place]  [address]      PATIENT NAME: Romero Diaz  : 1960  DATE: 22  MRN: [id number]      Billing Provider: DAVIN Toro  Level of Service:   Patient PCP Information       Provider PCP Type    DAVIN Napier General            Reason for Visit / Chief Complaint: URI (Dx'd w/ COVID on . Persistent non-productive cough, chest discomfort), Fatigue, Diarrhea, and Sinus Problem (Pressure )       Update PCP  Update Chief Complaint         History of Present Illness / Problem Focused Workflow     Romero Diaz presents to the clinic with URI (Dx'd w/ COVID on . Persistent non-productive cough, chest discomfort), Fatigue, Diarrhea, and Sinus Problem (Pressure )     Patient presents to clinic with continued fatigue, congestion, cough x 2 weeks. Covid . Patient has taken prednisone with some improvement. Continued dry cough and severe fatigue.       Review of Systems     @Review of Systems   Constitutional:  Positive for fatigue. Negative for fever.   HENT:  Negative for nasal congestion, ear pain, postnasal drip, rhinorrhea and sinus pressure/congestion.    Eyes:  Negative for discharge and itching.   Respiratory:  Positive for cough. Negative for chest tightness, shortness of breath and wheezing.    Cardiovascular:  Negative for chest pain and palpitations.   Gastrointestinal:  Negative for abdominal pain, blood in stool, change in bowel habit and change in bowel habit.   Genitourinary:  Negative for dysuria.   Musculoskeletal:  Negative for joint swelling.   Neurological:  Negative for dizziness and headaches.     Medical / Social / Family History     Past Medical History:   Diagnosis Date    Acquired polycystic kidney disease     Chronic sinusitis     Deviated nasal septum     SVT (supraventricular tachycardia)        Past Surgical History:   Procedure Laterality Date    CARDIAC ELECTROPHYSIOLOGY STUDY AND  ABLATION       at [facility]    kidney removed Left 2007    left leg surgery         Social History    reports that he has never smoked. He has never used smokeless tobacco. He reports current alcohol use. He reports that he does not use drugs.    Family History  's family history includes Esophageal cancer in his father; Osteoporosis in his mother; Stomach cancer in his father.    Medications and Allergies     Medications  Outpatient Medications Marked as Taking for the 12/6/22 encounter (Office Visit) with DAVIN Toro   Medication Sig Dispense Refill    esomeprazole (NEXIUM) 40 MG capsule Take 40 mg by mouth daily as needed.         Allergies  Review of patient's allergies indicates:  No Known Allergies    Physical Examination     Vitals:    12/06/22 1349   BP: (!) 140/80   Pulse: 94   Resp: 20   Temp: 98.8 °F (37.1 °C)     Physical Exam  Constitutional:       General: He is not in acute distress.     Appearance: Normal appearance.   Cardiovascular:      Rate and Rhythm: Normal rate and regular rhythm.   Pulmonary:      Effort: Pulmonary effort is normal. No respiratory distress.      Breath sounds: Normal breath sounds. No wheezing, rhonchi or rales.   Skin:     General: Skin is warm and dry.   Neurological:      Mental Status: He is alert.   Psychiatric:         Mood and Affect: Mood normal.         Behavior: Behavior normal.             Lab Results   Component Value Date    WBC 6.97 11/18/2021    HGB 15.2 11/18/2021    HCT 46.1 11/18/2021    MCV 86.2 11/18/2021     11/18/2021          Sodium   Date Value Ref Range Status   11/18/2021 138 136 - 145 mmol/L Final     Potassium   Date Value Ref Range Status   11/18/2021 4.1 3.5 - 5.1 mmol/L Final     Chloride   Date Value Ref Range Status   11/18/2021 107 98 - 107 mmol/L Final     CO2   Date Value Ref Range Status   11/18/2021 28 21 - 32 mmol/L Final     Glucose   Date Value Ref Range Status   11/18/2021 93 74 - 106 mg/dL Final     BUN   Date  Value Ref Range Status   11/18/2021 17 7 - 18 mg/dL Final     Creatinine   Date Value Ref Range Status   11/18/2021 1.40 (H) 0.70 - 1.30 mg/dL Final     Calcium   Date Value Ref Range Status   11/18/2021 9.0 8.5 - 10.1 mg/dL Final     Total Protein   Date Value Ref Range Status   11/18/2021 7.3 6.4 - 8.2 g/dL Final     Albumin   Date Value Ref Range Status   11/18/2021 3.5 3.5 - 5.0 g/dL Final     Bilirubin, Total   Date Value Ref Range Status   11/18/2021 0.4 0.0 - 1.2 mg/dL Final     Alk Phos   Date Value Ref Range Status   11/18/2021 71 45 - 115 U/L Final     AST   Date Value Ref Range Status   11/18/2021 19 15 - 37 U/L Final     ALT   Date Value Ref Range Status   11/18/2021 26 16 - 61 U/L Final     Anion Gap   Date Value Ref Range Status   11/18/2021 7 7 - 16 mmol/L Final     eGFR   Date Value Ref Range Status   11/18/2021 55 (L) >=60 mL/min/1.73m² Final      US Kidney  Narrative: EXAMINATION:  US KIDNEY    CLINICAL HISTORY:  polycystic kidney disease;  Polycystic kidney, unspecified    TECHNIQUE:  Ultrasound of the kidneys was performed.    COMPARISON:  None.    FINDINGS:  The left kidney is surgically absent.    The right kidney measures 13.7 x 7.6 x 7.1 cm.  The cortical thickness is near normal.  The parenchymal echogenicity is normal.  Normal arterial flow is seen to the right kidney and there is no hydronephrosis.  At the midpole of the right kidney, there is a 1.2 x 0.5 x 1.1 cm hyperechoic focus consistent with a calculus.  At the midpole there is a small cyst measuring 1.6 x 1.8 x 1.5 cm and a smaller cyst measuring 0.8 x 0.8 x 1.0 cm.  No suspicious masses.  Impression: Two small cysts at the midpole of the right kidney.  Right renal calculus.  No hydronephrosis.    Electronically signed by: Lois Chapman  Date:    11/22/2021  Time:    13:32     Procedures   Assessment and Plan (including Health Maintenance)      Problem List  Smart Sets  Document Outside HM   :    Plan:           Problem List Items  Addressed This Visit          ID    COVID-19 - Primary    Current Assessment & Plan     Azithromycin - discussed use and side effects  Started patient on proair- discussed use and side effects, if causes tachycardia d/c medication immediately  Continue to push fluids, liquid IV   Add vitamin C and vitamin d  Discussed disease process, that it may take several months to recover from Covid  flonase / antihistamine for congestion          Relevant Medications    azithromycin (Z-BLAYNE) 250 MG tablet    albuterol (PROAIR HFA) 90 mcg/actuation inhaler     Other Visit Diagnoses       Cough, unspecified type        Relevant Orders    X-Ray Chest PA And Lateral            Health Maintenance Topics with due status: Not Due       Topic Last Completion Date    Lipid Panel 11/18/2021       No future appointments.     Health Maintenance Due   Topic Date Due    Hepatitis C Screening  Never done    COVID-19 Vaccine (1) Never done    TETANUS VACCINE  Never done    Colorectal Cancer Screening  Never done    Shingles Vaccine (1 of 2) Never done    Influenza Vaccine (1) Never done        Follow up if symptoms worsen or fail to improve.     Signature:  DAVIN Toro  [facility]  [address] [place] [address], [place]  [address]    Date of encounter: 12/6/22

## 2022-12-06 NOTE — ASSESSMENT & PLAN NOTE
Azithromycin - discussed use and side effects  Started patient on proair- discussed use and side effects, if causes tachycardia d/c medication immediately  Continue to push fluids, liquid IV   Add vitamin C and vitamin d  Discussed disease process, that it may take several months to recover from Covid  flonase / antihistamine for congestion

## 2023-06-09 DIAGNOSIS — Z71.89 COMPLEX CARE COORDINATION: ICD-10-CM

## 2023-09-26 ENCOUNTER — OFFICE VISIT (OUTPATIENT)
Dept: FAMILY MEDICINE | Facility: CLINIC | Age: 63
End: 2023-09-26
Payer: MEDICARE

## 2023-09-26 VITALS
HEART RATE: 71 BPM | HEIGHT: 73 IN | WEIGHT: 236 LBS | RESPIRATION RATE: 18 BRPM | BODY MASS INDEX: 31.28 KG/M2 | SYSTOLIC BLOOD PRESSURE: 148 MMHG | OXYGEN SATURATION: 99 % | TEMPERATURE: 99 F | DIASTOLIC BLOOD PRESSURE: 90 MMHG

## 2023-09-26 DIAGNOSIS — R03.0 ELEVATED BP WITHOUT DIAGNOSIS OF HYPERTENSION: ICD-10-CM

## 2023-09-26 DIAGNOSIS — K14.8 LESION OF TONGUE: Primary | ICD-10-CM

## 2023-09-26 PROCEDURE — 3008F BODY MASS INDEX DOCD: CPT | Mod: ,,, | Performed by: NURSE PRACTITIONER

## 2023-09-26 PROCEDURE — 3008F PR BODY MASS INDEX (BMI) DOCUMENTED: ICD-10-PCS | Mod: ,,, | Performed by: NURSE PRACTITIONER

## 2023-09-26 PROCEDURE — 3080F PR MOST RECENT DIASTOLIC BLOOD PRESSURE >= 90 MM HG: ICD-10-PCS | Mod: ,,, | Performed by: NURSE PRACTITIONER

## 2023-09-26 PROCEDURE — 3077F PR MOST RECENT SYSTOLIC BLOOD PRESSURE >= 140 MM HG: ICD-10-PCS | Mod: ,,, | Performed by: NURSE PRACTITIONER

## 2023-09-26 PROCEDURE — 1159F PR MEDICATION LIST DOCUMENTED IN MEDICAL RECORD: ICD-10-PCS | Mod: ,,, | Performed by: NURSE PRACTITIONER

## 2023-09-26 PROCEDURE — 3077F SYST BP >= 140 MM HG: CPT | Mod: ,,, | Performed by: NURSE PRACTITIONER

## 2023-09-26 PROCEDURE — 1159F MED LIST DOCD IN RCRD: CPT | Mod: ,,, | Performed by: NURSE PRACTITIONER

## 2023-09-26 PROCEDURE — 99213 PR OFFICE/OUTPT VISIT, EST, LEVL III, 20-29 MIN: ICD-10-PCS | Mod: ,,, | Performed by: NURSE PRACTITIONER

## 2023-09-26 PROCEDURE — 3080F DIAST BP >= 90 MM HG: CPT | Mod: ,,, | Performed by: NURSE PRACTITIONER

## 2023-09-26 PROCEDURE — 1160F RVW MEDS BY RX/DR IN RCRD: CPT | Mod: ,,, | Performed by: NURSE PRACTITIONER

## 2023-09-26 PROCEDURE — 1160F PR REVIEW ALL MEDS BY PRESCRIBER/CLIN PHARMACIST DOCUMENTED: ICD-10-PCS | Mod: ,,, | Performed by: NURSE PRACTITIONER

## 2023-09-26 PROCEDURE — 99213 OFFICE O/P EST LOW 20 MIN: CPT | Mod: ,,, | Performed by: NURSE PRACTITIONER

## 2023-09-26 NOTE — PROGRESS NOTES
"Clinic Note    Romero Diaz is a 62 y.o. male     Chief Complaint:   Chief Complaint   Patient presents with    Mouth Lesions     Spot on right side of tongue. Has been there about 1 month. Says that it affect eating and talking. Denies tobacco products.         Subjective:    Patient comes in today with spouse. Patient reports lesion to right side of tongue. States noticed area about 1-2 months ago. Denies pain but states uncomfortable. Denies bleeding. Denies drainage. Denies fever. Denies using tobacco products. States lesion is firm. Affects speech and eating.   Patient states he has one kidney and has polycystic kidney disease. Patient sees Dr. Beckman routinely every 4-6 months.     Mouth Lesions   Associated symptoms include mouth sores. Pertinent negatives include no fever, no headaches, no sore throat and no cough.        Allergies:   Review of patient's allergies indicates:  No Known Allergies     Past Medical History:  Past Medical History:   Diagnosis Date    Acquired polycystic kidney disease     Chronic sinusitis     Deviated nasal septum     SVT (supraventricular tachycardia)         Current Medications:    Current Outpatient Medications:     esomeprazole (NEXIUM) 40 MG capsule, Take 40 mg by mouth daily as needed., Disp: , Rfl:        Review of Systems   Constitutional:  Negative for fever.   HENT:  Positive for mouth sores. Negative for dental problem, drooling, sore throat, trouble swallowing and voice change.    Respiratory:  Negative for cough and shortness of breath.    Cardiovascular:  Negative for chest pain.   Neurological:  Negative for headaches.          Objective:    BP (!) 148/90 (BP Location: Right arm, Patient Position: Sitting, BP Method: Medium (Manual))   Pulse 71   Temp 98.7 °F (37.1 °C) (Oral)   Resp 18   Ht 6' 1" (1.854 m)   Wt 107 kg (236 lb)   SpO2 99%   BMI 31.14 kg/m²      Physical Exam  Constitutional:       Appearance: Normal appearance.   HENT:      Mouth/Throat:      " Mouth: Mucous membranes are moist.      Tongue: Lesions present.      Comments: Circular firm lesion to right side of tongue. Please see picture attached.  Eyes:      Extraocular Movements: Extraocular movements intact.   Cardiovascular:      Rate and Rhythm: Normal rate and regular rhythm.      Pulses: Normal pulses.      Heart sounds: Normal heart sounds.   Pulmonary:      Effort: Pulmonary effort is normal.      Breath sounds: Normal breath sounds.   Neurological:      Mental Status: He is alert and oriented to person, place, and time.            Assessment and Plan:    1. Lesion of tongue    2. Elevated BP without diagnosis of hypertension         Lesion of tongue  -refer to ent Dr. Pappas    Elevated BP without diagnosis of hypertension  -spouse to monitor bp at home. If persistently >140/90 spouse states will f/u with Dr. Beckman  -low salt diet and exercise        There are no Patient Instructions on file for this visit.   Follow up if symptoms worsen or fail to improve.

## 2023-09-27 ENCOUNTER — OFFICE VISIT (OUTPATIENT)
Dept: OTOLARYNGOLOGY | Facility: CLINIC | Age: 63
End: 2023-09-27
Payer: MEDICARE

## 2023-09-27 VITALS — WEIGHT: 236 LBS | BODY MASS INDEX: 30.29 KG/M2 | HEIGHT: 74 IN

## 2023-09-27 DIAGNOSIS — K14.8 TONGUE LESION: Primary | ICD-10-CM

## 2023-09-27 PROCEDURE — 99204 OFFICE O/P NEW MOD 45 MIN: CPT | Mod: S$PBB,,, | Performed by: OTOLARYNGOLOGY

## 2023-09-27 PROCEDURE — 1160F RVW MEDS BY RX/DR IN RCRD: CPT | Mod: CPTII,,, | Performed by: OTOLARYNGOLOGY

## 2023-09-27 PROCEDURE — 3008F BODY MASS INDEX DOCD: CPT | Mod: CPTII,,, | Performed by: OTOLARYNGOLOGY

## 2023-09-27 PROCEDURE — 99214 OFFICE O/P EST MOD 30 MIN: CPT | Mod: PBBFAC | Performed by: OTOLARYNGOLOGY

## 2023-09-27 PROCEDURE — 3008F PR BODY MASS INDEX (BMI) DOCUMENTED: ICD-10-PCS | Mod: CPTII,,, | Performed by: OTOLARYNGOLOGY

## 2023-09-27 PROCEDURE — 99204 PR OFFICE/OUTPT VISIT, NEW, LEVL IV, 45-59 MIN: ICD-10-PCS | Mod: S$PBB,,, | Performed by: OTOLARYNGOLOGY

## 2023-09-27 PROCEDURE — 1160F PR REVIEW ALL MEDS BY PRESCRIBER/CLIN PHARMACIST DOCUMENTED: ICD-10-PCS | Mod: CPTII,,, | Performed by: OTOLARYNGOLOGY

## 2023-09-27 PROCEDURE — 1159F PR MEDICATION LIST DOCUMENTED IN MEDICAL RECORD: ICD-10-PCS | Mod: CPTII,,, | Performed by: OTOLARYNGOLOGY

## 2023-09-27 PROCEDURE — 1159F MED LIST DOCD IN RCRD: CPT | Mod: CPTII,,, | Performed by: OTOLARYNGOLOGY

## 2023-09-27 NOTE — H&P (VIEW-ONLY)
"Subjective:       Patient ID: Romero Diaz is a 62 y.o. male.    Chief Complaint: tongue (Patient complaining of having a "lump" on his tongue. States in located on the far right side of his tongue by his wisdom teeth.)  + etoh   HPI  Review of Systems   HENT:  Positive for mouth sores.    All other systems reviewed and are negative.      Objective:      Physical Exam  General: NAD  Head: Normocephalic, atraumatic, no facial asymmetry/normal strength,  Ears: Both auricules normal in appearance, w/o deformities tympanic membranes normal external auditory canals normal  Nose: External nose w/o deformities normal turbinates no drainage or inflammation  Oral Cavity: Lips, gums, floor of mouth, tongue 1.5 cm lesion right lateral tongue hard palate, and buccal mucosa without mass/lesion  Oropharynx: Mucosa pink and moist, soft palate, posterior pharynx and oropharyngeal wall without mass/lesion  Neck: Supple, symmetric, trachea midline, no palpable mass/lesion, no palpable cervical lymphadenopathy  Skin: Warm and dry, no concerning lesions  Respiratory: Respirations even, unlabored   Assessment:       1. Tongue lesion      1.5 cm   Plan:       Resection of lesion in OR     "

## 2023-10-06 ENCOUNTER — TELEPHONE (OUTPATIENT)
Dept: OTOLARYNGOLOGY | Facility: CLINIC | Age: 63
End: 2023-10-06
Payer: MEDICARE

## 2023-10-09 ENCOUNTER — ANESTHESIA EVENT (OUTPATIENT)
Dept: SURGERY | Facility: HOSPITAL | Age: 63
End: 2023-10-09
Payer: MEDICARE

## 2023-10-10 ENCOUNTER — HOSPITAL ENCOUNTER (OUTPATIENT)
Facility: HOSPITAL | Age: 63
Discharge: HOME | End: 2023-10-10
Attending: OTOLARYNGOLOGY | Admitting: OTOLARYNGOLOGY
Payer: MEDICARE

## 2023-10-10 ENCOUNTER — ANESTHESIA (OUTPATIENT)
Dept: SURGERY | Facility: HOSPITAL | Age: 63
End: 2023-10-10
Payer: MEDICARE

## 2023-10-10 VITALS
BODY MASS INDEX: 28.25 KG/M2 | RESPIRATION RATE: 16 BRPM | WEIGHT: 220 LBS | DIASTOLIC BLOOD PRESSURE: 89 MMHG | SYSTOLIC BLOOD PRESSURE: 147 MMHG | TEMPERATURE: 98 F | OXYGEN SATURATION: 98 % | HEART RATE: 67 BPM

## 2023-10-10 DIAGNOSIS — K14.8 TONGUE LESION: ICD-10-CM

## 2023-10-10 DIAGNOSIS — K14.8 LESION OF TONGUE: Primary | ICD-10-CM

## 2023-10-10 PROCEDURE — 25000003 PHARM REV CODE 250: Performed by: OTOLARYNGOLOGY

## 2023-10-10 PROCEDURE — D9220A PRA ANESTHESIA: Mod: ANES,,, | Performed by: ANESTHESIOLOGY

## 2023-10-10 PROCEDURE — 88305 TISSUE EXAM BY PATHOLOGIST: CPT | Mod: TC,SUR | Performed by: OTOLARYNGOLOGY

## 2023-10-10 PROCEDURE — 37000008 HC ANESTHESIA 1ST 15 MINUTES: Performed by: OTOLARYNGOLOGY

## 2023-10-10 PROCEDURE — D9220A PRA ANESTHESIA: ICD-10-PCS | Mod: CRNA,,, | Performed by: NURSE ANESTHETIST, CERTIFIED REGISTERED

## 2023-10-10 PROCEDURE — 63600175 PHARM REV CODE 636 W HCPCS: Performed by: NURSE ANESTHETIST, CERTIFIED REGISTERED

## 2023-10-10 PROCEDURE — 71000016 HC POSTOP RECOV ADDL HR: Performed by: OTOLARYNGOLOGY

## 2023-10-10 PROCEDURE — 36000707: Performed by: OTOLARYNGOLOGY

## 2023-10-10 PROCEDURE — 71000033 HC RECOVERY, INTIAL HOUR: Performed by: OTOLARYNGOLOGY

## 2023-10-10 PROCEDURE — 36000706: Performed by: OTOLARYNGOLOGY

## 2023-10-10 PROCEDURE — D9220A PRA ANESTHESIA: Mod: CRNA,,, | Performed by: NURSE ANESTHETIST, CERTIFIED REGISTERED

## 2023-10-10 PROCEDURE — 71000015 HC POSTOP RECOV 1ST HR: Performed by: OTOLARYNGOLOGY

## 2023-10-10 PROCEDURE — 25000003 PHARM REV CODE 250: Performed by: NURSE ANESTHETIST, CERTIFIED REGISTERED

## 2023-10-10 PROCEDURE — 88342 SURGICAL PATHOLOGY: ICD-10-PCS | Mod: 26,,, | Performed by: PATHOLOGY

## 2023-10-10 PROCEDURE — D9220A PRA ANESTHESIA: ICD-10-PCS | Mod: ANES,,, | Performed by: ANESTHESIOLOGY

## 2023-10-10 PROCEDURE — 37000009 HC ANESTHESIA EA ADD 15 MINS: Performed by: OTOLARYNGOLOGY

## 2023-10-10 PROCEDURE — 88305 TISSUE EXAM BY PATHOLOGIST: CPT | Mod: 26,,, | Performed by: PATHOLOGY

## 2023-10-10 PROCEDURE — 41112 PR EXCIS TONGUE LESN,ANT 2/3+CLOS: ICD-10-PCS | Mod: ,,, | Performed by: OTOLARYNGOLOGY

## 2023-10-10 PROCEDURE — 41112 EXCISION OF TONGUE LESION: CPT | Mod: ,,, | Performed by: OTOLARYNGOLOGY

## 2023-10-10 PROCEDURE — 88342 IMHCHEM/IMCYTCHM 1ST ANTB: CPT | Mod: 26,,, | Performed by: PATHOLOGY

## 2023-10-10 PROCEDURE — 88305 SURGICAL PATHOLOGY: ICD-10-PCS | Mod: 26,,, | Performed by: PATHOLOGY

## 2023-10-10 RX ORDER — LIDOCAINE HYDROCHLORIDE AND EPINEPHRINE 20; 10 MG/ML; UG/ML
INJECTION, SOLUTION INFILTRATION; PERINEURAL
Status: DISCONTINUED | OUTPATIENT
Start: 2023-10-10 | End: 2023-10-10 | Stop reason: HOSPADM

## 2023-10-10 RX ORDER — MORPHINE SULFATE 10 MG/ML
4 INJECTION INTRAMUSCULAR; INTRAVENOUS; SUBCUTANEOUS EVERY 5 MIN PRN
Status: DISCONTINUED | OUTPATIENT
Start: 2023-10-10 | End: 2023-10-10 | Stop reason: HOSPADM

## 2023-10-10 RX ORDER — DIPHENHYDRAMINE HYDROCHLORIDE 50 MG/ML
25 INJECTION INTRAMUSCULAR; INTRAVENOUS EVERY 6 HOURS PRN
Status: DISCONTINUED | OUTPATIENT
Start: 2023-10-10 | End: 2023-10-10 | Stop reason: HOSPADM

## 2023-10-10 RX ORDER — ONDANSETRON 2 MG/ML
4 INJECTION INTRAMUSCULAR; INTRAVENOUS DAILY PRN
Status: DISCONTINUED | OUTPATIENT
Start: 2023-10-10 | End: 2023-10-10 | Stop reason: HOSPADM

## 2023-10-10 RX ORDER — LIDOCAINE HYDROCHLORIDE 40 MG/ML
SOLUTION TOPICAL
Status: DISCONTINUED | OUTPATIENT
Start: 2023-10-10 | End: 2023-10-10

## 2023-10-10 RX ORDER — FENTANYL CITRATE 50 UG/ML
INJECTION, SOLUTION INTRAMUSCULAR; INTRAVENOUS
Status: DISCONTINUED | OUTPATIENT
Start: 2023-10-10 | End: 2023-10-10

## 2023-10-10 RX ORDER — ROCURONIUM BROMIDE 10 MG/ML
INJECTION, SOLUTION INTRAVENOUS
Status: DISCONTINUED | OUTPATIENT
Start: 2023-10-10 | End: 2023-10-10

## 2023-10-10 RX ORDER — LIDOCAINE HYDROCHLORIDE 20 MG/ML
INJECTION, SOLUTION EPIDURAL; INFILTRATION; INTRACAUDAL; PERINEURAL
Status: DISCONTINUED | OUTPATIENT
Start: 2023-10-10 | End: 2023-10-10

## 2023-10-10 RX ORDER — HYDROCODONE BITARTRATE AND ACETAMINOPHEN 7.5; 325 MG/15ML; MG/15ML
15 SOLUTION ORAL EVERY 4 HOURS PRN
Status: DISCONTINUED | OUTPATIENT
Start: 2023-10-10 | End: 2023-10-10 | Stop reason: HOSPADM

## 2023-10-10 RX ORDER — SODIUM CHLORIDE 9 MG/ML
INJECTION, SOLUTION INTRAVENOUS CONTINUOUS
Status: DISCONTINUED | OUTPATIENT
Start: 2023-10-10 | End: 2023-10-10 | Stop reason: HOSPADM

## 2023-10-10 RX ORDER — IPRATROPIUM BROMIDE AND ALBUTEROL SULFATE 2.5; .5 MG/3ML; MG/3ML
3 SOLUTION RESPIRATORY (INHALATION) ONCE
Status: DISCONTINUED | OUTPATIENT
Start: 2023-10-10 | End: 2023-10-10 | Stop reason: HOSPADM

## 2023-10-10 RX ORDER — PROPOFOL 10 MG/ML
VIAL (ML) INTRAVENOUS
Status: DISCONTINUED | OUTPATIENT
Start: 2023-10-10 | End: 2023-10-10

## 2023-10-10 RX ORDER — HYDROMORPHONE HYDROCHLORIDE 2 MG/ML
0.5 INJECTION, SOLUTION INTRAMUSCULAR; INTRAVENOUS; SUBCUTANEOUS EVERY 5 MIN PRN
Status: DISCONTINUED | OUTPATIENT
Start: 2023-10-10 | End: 2023-10-10 | Stop reason: HOSPADM

## 2023-10-10 RX ORDER — MEPERIDINE HYDROCHLORIDE 25 MG/ML
25 INJECTION INTRAMUSCULAR; INTRAVENOUS; SUBCUTANEOUS EVERY 10 MIN PRN
Status: DISCONTINUED | OUTPATIENT
Start: 2023-10-10 | End: 2023-10-10 | Stop reason: HOSPADM

## 2023-10-10 RX ADMIN — ROCURONIUM BROMIDE 40 MG: 10 INJECTION, SOLUTION INTRAVENOUS at 08:10

## 2023-10-10 RX ADMIN — SODIUM CHLORIDE: 9 INJECTION, SOLUTION INTRAVENOUS at 06:10

## 2023-10-10 RX ADMIN — FENTANYL CITRATE 100 MCG: 50 INJECTION INTRAMUSCULAR; INTRAVENOUS at 08:10

## 2023-10-10 RX ADMIN — LIDOCAINE HYDROCHLORIDE 80 MG: 20 INJECTION, SOLUTION INTRAVENOUS at 08:10

## 2023-10-10 RX ADMIN — LIDOCAINE HYDROCHLORIDE 100 MG: 40 SOLUTION TOPICAL at 08:10

## 2023-10-10 RX ADMIN — PROPOFOL 150 MG: 10 INJECTION, EMULSION INTRAVENOUS at 08:10

## 2023-10-10 RX ADMIN — SUGAMMADEX 200 MG: 100 INJECTION, SOLUTION INTRAVENOUS at 08:10

## 2023-10-10 NOTE — OP NOTE
Surgery Date: 10/10/2023     Surgeon(s) and Role:     * Phi Pappas MD - Primary    Assisting Surgeon: None    Pre-op Diagnosis:  Tongue lesion [K14.8]    Post-op Diagnosis:  Post-Op Diagnosis Codes:     * Tongue lesion [K14.8]    Procedure(s) (LRB):  EXCISION, LESION, TONGUE (Right)  After general ET anesthesia the right tongue lesion was injected with local 2 % xylocaine with epi 1:200,000 a wide excision was performed around the 2 cm lesion on the anterior 2/3 of right side of tongue It was closed with chromic inverted  The patient was irrigated well There was no further bleeding the patient was then reversed and taken to RR in stable condition.   Anesthesia: General    Operative Findings: tongue lesion    Estimated Blood Loss:5 ml

## 2023-10-10 NOTE — OR NURSING
0852  Received to pacu in stable condition on RA with airway in place.    0900 Airway removed.    0925 Transferred back to OP 10 in stable condition on RA.  BS report to Chula Frederick RN @ 2220 .  VSS @ 149/85, 98%RA, 74HR and 16HR . Family at BS.

## 2023-10-10 NOTE — ANESTHESIA PROCEDURE NOTES
Intubation    Date/Time: 10/10/2023 8:33 AM    Performed by: Stepan Malhotra CRNA  Authorized by: Merlin Desir MD    Intubation:     Induction:  Intravenous    Intubated:  Postinduction    Mask Ventilation:  Easy mask    Attempts:  1    Attempted By:  CRNA    Method of Intubation:  Direct    Blade:  Arnie 4    Laryngeal View Grade: Grade IIA - cords partially seen      Difficult Airway Encountered?: No      Airway Device:  Oral endotracheal tube    Airway Device Size:  7.0    Style/Cuff Inflation:  Cuffed    Inflation Amount (mL):  8    Tube secured:  23    Secured at:  The lips    Placement Verified By:  Capnometry    Complicating Factors:  None    Findings Post-Intubation:  BS equal bilateral and atraumatic/condition of teeth unchanged

## 2023-10-10 NOTE — ANESTHESIA PREPROCEDURE EVALUATION
10/09/2023  Romero Diaz is a 62 y.o., male.      Pre-op Assessment    I have reviewed the Patient Summary Reports.     I have reviewed the Nursing Notes. I have reviewed the NPO Status.   I have reviewed the Medications.     Review of Systems  Anesthesia Hx:  Denies Family Hx of Anesthesia complications.   Denies Personal Hx of Anesthesia complications.   Social:  Non-Smoker, No Alcohol Use    Hematology/Oncology:  Hematology Normal   Oncology Normal     EENT/Dental:EENT/Dental Normal   Cardiovascular:  Cardiovascular Normal     Pulmonary:  Pulmonary Normal    Renal/:  Renal/ Normal     Hepatic/GI:  Hepatic/GI Normal    Musculoskeletal:  Musculoskeletal Normal    Neurological:  Neurology Normal    Endocrine:  Endocrine Normal    Dermatological:  Skin Normal    Psych:  Psychiatric Normal           Physical Exam  General: Well nourished, Cooperative, Alert and Oriented    Airway:  Mallampati: II / II  Mouth Opening: Normal  TM Distance: Normal  Neck ROM: Normal ROM    Dental:  Intact    Chest/Lungs:  Clear to auscultation    Heart:  Rate: Normal  Rhythm: Regular Rhythm  Sounds: Normal        Anesthesia Plan  Type of Anesthesia, risks & benefits discussed:    Anesthesia Type: Gen Supraglottic Airway, Gen Natural Airway, MAC  Intra-op Monitoring Plan: Standard ASA Monitors  Post Op Pain Control Plan: multimodal analgesia  Induction:  IV  Airway Plan: Direct  Informed Consent: Informed consent signed with the Patient and all parties understand the risks and agree with anesthesia plan.  All questions answered.   ASA Score: 1  Day of Surgery Review of History & Physical: H&P Update referred to the surgeon/provider.I have interviewed and examined the patient. I have reviewed the patient's H&P dated: There are no significant changes.     Ready For Surgery From Anesthesia Perspective.     .

## 2023-10-10 NOTE — TRANSFER OF CARE
Anesthesia Transfer of Care Note    Patient: Romero Diaz    Procedure(s) Performed: Procedure(s) (LRB):  EXCISION, LESION, TONGUE (Right)    Patient location: PACU    Anesthesia Type: general    Transport from OR: Transported from OR on room air with adequate spontaneous ventilation    Post pain: adequate analgesia    Post assessment: no apparent anesthetic complications and tolerated procedure well    Post vital signs: stable    Level of consciousness: responds to stimulation    Nausea/Vomiting: no nausea/vomiting    Complications: none    Transfer of care protocol was followedComments: Report Given to PACU rn VSS      Last vitals:   Visit Vitals  /60 (Patient Position: Lying)   Pulse 80   Temp 36.6 °C (97.8 °F) (Oral)   Resp 16   Wt 99.8 kg (220 lb)   SpO2 98%   BMI 28.25 kg/m²

## 2023-10-10 NOTE — BRIEF OP NOTE
[facility] - Orthopedic Periop Services  Brief Operative Note    Surgery Date: 10/10/2023     Surgeon(s) and Role:     * Phi Pappas MD - Primary    Assisting Surgeon: None    Pre-op Diagnosis:  Tongue lesion [K14.8]    Post-op Diagnosis:  Post-Op Diagnosis Codes:     * Tongue lesion [K14.8]    Procedure(s) (LRB):  EXCISION, LESION, TONGUE (Right)    Anesthesia: General    Operative Findings: tongue lesion    Estimated Blood Loss:5 ml  Specimens:   Specimen (24h ago, onward)       Start     Ordered    10/10/23 0835  Surgical Pathology  RELEASE UPON ORDERING         10/10/23 0835                      Discharge Note    OUTCOME: Patient tolerated treatment/procedure well without complication and is now ready for discharge.    DISPOSITION: Home or Self Care    FINAL DIAGNOSIS:  Tongue lesion  FOLLOWUP: In clinic      DISCHARGE INSTRUCTIONS:  No discharge procedures on file.

## 2023-10-11 LAB
DHEA SERPL-MCNC: NORMAL
ESTROGEN SERPL-MCNC: NORMAL PG/ML
INSULIN SERPL-ACNC: NORMAL U[IU]/ML
LAB AP GROSS DESCRIPTION: NORMAL
LAB AP LABORATORY NOTES: NORMAL
LAB AP SYNOPTIC CHECKLIST: NORMAL
T3RU NFR SERPL: NORMAL %

## 2023-10-11 NOTE — ANESTHESIA POSTPROCEDURE EVALUATION
Anesthesia Post Evaluation    Patient: Romero Diaz    Procedure(s) Performed: Procedure(s) (LRB):  EXCISION, LESION, TONGUE (Right)    Final Anesthesia Type: general      Patient location during evaluation: PACU  Patient participation: Yes- Able to Participate  Level of consciousness: awake and alert  Post-procedure vital signs: reviewed and stable  Pain management: adequate  Airway patency: patent  RICK mitigation strategies: Multimodal analgesia  PONV status at discharge: No PONV  Anesthetic complications: no      Cardiovascular status: blood pressure returned to baseline  Respiratory status: unassisted  Hydration status: euvolemic  Follow-up not needed.          Vitals Value Taken Time   /89 10/10/23 1031   Temp 36.6 °C (97.8 °F) 10/10/23 0855   Pulse 71 10/10/23 1031   Resp 16 10/10/23 1030   SpO2 97 % 10/10/23 1030   Vitals shown include unvalidated device data.      Event Time   Out of Recovery 09:25:00         Pain/Nahum Score: Nahum Score: 10 (10/10/2023 10:30 AM)

## 2023-10-19 ENCOUNTER — OFFICE VISIT (OUTPATIENT)
Dept: OTOLARYNGOLOGY | Facility: CLINIC | Age: 63
End: 2023-10-19
Payer: MEDICARE

## 2023-10-19 VITALS — WEIGHT: 220 LBS | BODY MASS INDEX: 28.23 KG/M2 | HEIGHT: 74 IN

## 2023-10-19 DIAGNOSIS — C02.9 SQUAMOUS CELL CARCINOMA OF TONGUE: Primary | ICD-10-CM

## 2023-10-19 PROCEDURE — 1160F PR REVIEW ALL MEDS BY PRESCRIBER/CLIN PHARMACIST DOCUMENTED: ICD-10-PCS | Mod: CPTII,,, | Performed by: OTOLARYNGOLOGY

## 2023-10-19 PROCEDURE — 99024 PR POST-OP FOLLOW-UP VISIT: ICD-10-PCS | Mod: ,,, | Performed by: OTOLARYNGOLOGY

## 2023-10-19 PROCEDURE — 1159F PR MEDICATION LIST DOCUMENTED IN MEDICAL RECORD: ICD-10-PCS | Mod: CPTII,,, | Performed by: OTOLARYNGOLOGY

## 2023-10-19 PROCEDURE — 99213 OFFICE O/P EST LOW 20 MIN: CPT | Mod: PBBFAC | Performed by: OTOLARYNGOLOGY

## 2023-10-19 PROCEDURE — 1159F MED LIST DOCD IN RCRD: CPT | Mod: CPTII,,, | Performed by: OTOLARYNGOLOGY

## 2023-10-19 PROCEDURE — 1160F RVW MEDS BY RX/DR IN RCRD: CPT | Mod: CPTII,,, | Performed by: OTOLARYNGOLOGY

## 2023-10-19 PROCEDURE — 99024 POSTOP FOLLOW-UP VISIT: CPT | Mod: ,,, | Performed by: OTOLARYNGOLOGY

## 2023-10-19 NOTE — PROGRESS NOTES
Subjective:       Patient ID: Romero Diaz is a 62 y.o. male.    Chief Complaint: Follow-up (Post-op removal of tongue lesion. Patient complains of his tongue being sore.)    HPI  Review of Systems    Objective:      Physical Exam  Healing well path explained   Assessment:       1. Squamous cell carcinoma of tongue        Plan:       Getting PET scan   F/u 6 weeks

## 2024-01-09 DIAGNOSIS — Z71.89 COMPLEX CARE COORDINATION: ICD-10-CM

## 2024-08-09 DIAGNOSIS — Z71.89 COMPLEX CARE COORDINATION: ICD-10-CM

## 2024-09-06 ENCOUNTER — OFFICE VISIT (OUTPATIENT)
Dept: OTOLARYNGOLOGY | Facility: CLINIC | Age: 64
End: 2024-09-06
Payer: MEDICARE

## 2024-09-06 VITALS — WEIGHT: 220 LBS | BODY MASS INDEX: 28.23 KG/M2 | HEIGHT: 74 IN

## 2024-09-06 DIAGNOSIS — K14.8 TONGUE LESION: ICD-10-CM

## 2024-09-06 DIAGNOSIS — C02.9 SQUAMOUS CELL CARCINOMA OF TONGUE: Primary | ICD-10-CM

## 2024-09-06 PROCEDURE — 99213 OFFICE O/P EST LOW 20 MIN: CPT | Mod: PBBFAC | Performed by: OTOLARYNGOLOGY

## 2024-09-06 PROCEDURE — 99214 OFFICE O/P EST MOD 30 MIN: CPT | Mod: S$PBB,,, | Performed by: OTOLARYNGOLOGY

## 2024-09-06 PROCEDURE — 99999 PR PBB SHADOW E&M-EST. PATIENT-LVL III: CPT | Mod: PBBFAC,,, | Performed by: OTOLARYNGOLOGY

## 2024-09-06 NOTE — PROGRESS NOTES
Subjective:       Patient ID: Romero Diaz is a 63 y.o. male.    Chief Complaint: tongue lesion (Patient has a history of squamous cell carcinoma of tongue. States he has a lesion on both sides of his tongue. States he had surgery about a year ago to remove a lesion on the right side of his tongue.)    HPI  Review of Systems   HENT:  Positive for mouth sores.    All other systems reviewed and are negative.      Objective:      Physical Exam  General: NAD  Head: Normocephalic, atraumatic, no facial asymmetry/normal strength,  Ears: Both auricules normal in appearance, w/o deformities tympanic membranes normal external auditory canals normal  Nose: External nose w/o deformities normal turbinates no drainage or inflammation  Oral Cavity: Lips, gums, floor of mouth, tongue no evidence of recurrence  hard palate, and buccal mucosa without mass/lesion  Oropharynx: Mucosa pink and moist, soft palate, posterior pharynx and oropharyngeal wall without mass/lesion  Neck: Supple, symmetric, trachea midline, no palpable mass/lesion, no palpable cervical lymphadenopathy  Skin: Warm and dry, no concerning lesions  Respiratory: Respirations even, unlabored  Assessment:       1. Squamous cell carcinoma of tongue    2. Tongue lesion        Plan:       NER   F/u 2 months

## (undated) DEVICE — PENCIL ELECTROSURG HOLST W/BLD

## (undated) DEVICE — DRESSING TELFA STRL 4X3 LF

## (undated) DEVICE — ELECTRODE NDL EDGE 2 5/6IN

## (undated) DEVICE — GLOVE 6.5 PROTEXIS PI BLUE

## (undated) DEVICE — GOWN POLY REINF BRTH SLV XL

## (undated) DEVICE — TOWEL OR DISP STRL BLUE 4/PK

## (undated) DEVICE — GLOVE BIOGEL SKINSENSE PI 7.5

## (undated) DEVICE — GLOVE 7.0 PROTEXIS PI BLUE